# Patient Record
Sex: FEMALE | URBAN - METROPOLITAN AREA
[De-identification: names, ages, dates, MRNs, and addresses within clinical notes are randomized per-mention and may not be internally consistent; named-entity substitution may affect disease eponyms.]

---

## 2018-10-18 PROBLEM — 251158004 VENTRICULAR TACHYCARDIA, MONOMORPHIC: Status: ACTIVE | Noted: 2018-10-18

## 2018-10-18 PROBLEM — 95570007 KIDNEY STONE: Status: ACTIVE | Noted: 2018-10-18

## 2018-10-18 PROBLEM — 26389007 TOXIC MULTINODULAR GOITER: Status: ACTIVE | Noted: 2018-10-18

## 2018-10-18 PROBLEM — 313436004 TYPE II DIABETES MELLITUS WITHOUT COMPLICATION: Status: ACTIVE | Noted: 2018-10-18

## 2018-10-18 PROBLEM — 91302008 SEPSIS: Status: ACTIVE | Noted: 2018-10-18

## 2018-10-18 PROBLEM — 13644009 HYPERCHOLESTEROLEMIA: Status: ACTIVE | Noted: 2018-10-18

## 2021-08-28 ENCOUNTER — TELEPHONE ENCOUNTER (OUTPATIENT)
Dept: URBAN - METROPOLITAN AREA CLINIC 13 | Facility: CLINIC | Age: 56
End: 2021-08-28

## 2021-08-29 ENCOUNTER — TELEPHONE ENCOUNTER (OUTPATIENT)
Dept: URBAN - METROPOLITAN AREA CLINIC 13 | Facility: CLINIC | Age: 56
End: 2021-08-29

## 2021-08-29 RX ORDER — BIOTIN 2500 MCG
CAPSULE ORAL
Status: ACTIVE | COMMUNITY

## 2021-08-29 RX ORDER — MULTIVIT-MIN/FOLIC/VIT K/LYCOP 400-300MCG
TABLET ORAL
Status: ACTIVE | COMMUNITY

## 2021-08-29 RX ORDER — TRAMADOL HYDROCHLORIDE 50 MG/1
TABLET, FILM COATED ORAL
Status: ACTIVE | COMMUNITY

## 2021-08-29 RX ORDER — LEVOTHYROXINE SODIUM 200 MCG
TABLET ORAL
Status: ACTIVE | COMMUNITY

## 2021-08-29 RX ORDER — PRAVASTATIN SODIUM 80 MG/1
TABLET ORAL
Status: ACTIVE | COMMUNITY

## 2021-08-29 RX ORDER — DOCUSATE SODIUM 100 MG/1
CAPSULE, LIQUID FILLED ORAL
Status: ACTIVE | COMMUNITY

## 2021-08-29 RX ORDER — DAPAGLIFLOZIN 10 MG/1
TABLET, FILM COATED ORAL
Status: ACTIVE | COMMUNITY

## 2022-05-03 ENCOUNTER — WEB ENCOUNTER (OUTPATIENT)
Dept: URBAN - METROPOLITAN AREA CLINIC 82 | Facility: CLINIC | Age: 57
End: 2022-05-03

## 2022-05-05 ENCOUNTER — OFFICE VISIT (OUTPATIENT)
Dept: URBAN - METROPOLITAN AREA CLINIC 82 | Facility: CLINIC | Age: 57
End: 2022-05-05
Payer: COMMERCIAL

## 2022-05-05 ENCOUNTER — LAB OUTSIDE AN ENCOUNTER (OUTPATIENT)
Dept: URBAN - METROPOLITAN AREA CLINIC 82 | Facility: CLINIC | Age: 57
End: 2022-05-05

## 2022-05-05 DIAGNOSIS — Z80.0 FAMILY HISTORY OF STOMACH CANCER: ICD-10-CM

## 2022-05-05 DIAGNOSIS — K21.9 GASTROESOPHAGEAL REFLUX DISEASE WITHOUT ESOPHAGITIS: ICD-10-CM

## 2022-05-05 DIAGNOSIS — K86.89 FATTY PANCREAS: ICD-10-CM

## 2022-05-05 PROCEDURE — 99244 OFF/OP CNSLTJ NEW/EST MOD 40: CPT | Performed by: INTERNAL MEDICINE

## 2022-05-05 RX ORDER — TRAMADOL HYDROCHLORIDE 50 MG/1
TABLET, FILM COATED ORAL
Status: ACTIVE | COMMUNITY

## 2022-05-05 RX ORDER — LEVOTHYROXINE SODIUM 200 MCG
TABLET ORAL
Status: DISCONTINUED | COMMUNITY

## 2022-05-05 RX ORDER — ROSUVASTATIN CALCIUM 20 MG/1
1 TABLET TABLET, FILM COATED ORAL ONCE A DAY
Status: ACTIVE | COMMUNITY

## 2022-05-05 RX ORDER — LEVOTHYROXINE SODIUM 125 UG/1
1 TABLET IN THE MORNING ON AN EMPTY STOMACH TABLET ORAL ONCE A DAY
Status: ACTIVE | COMMUNITY

## 2022-05-05 RX ORDER — GABAPENTIN 300 MG/1
1 CAPSULE CAPSULE ORAL ONCE A DAY
Status: ACTIVE | COMMUNITY

## 2022-05-05 RX ORDER — DOCUSATE SODIUM 100 MG/1
CAPSULE, LIQUID FILLED ORAL
Status: DISCONTINUED | COMMUNITY

## 2022-05-05 RX ORDER — ORAL SEMAGLUTIDE 7 MG/1
1 TABLET AT LEAST 30 MINUTES BEFORE FIRST FOOD, BEVERAGE OR OTHER ORAL MEDICINE OF THE DAY TABLET ORAL ONCE A DAY
Status: ACTIVE | COMMUNITY

## 2022-05-05 RX ORDER — DAPAGLIFLOZIN 10 MG/1
TABLET, FILM COATED ORAL
Status: DISCONTINUED | COMMUNITY

## 2022-05-05 RX ORDER — ADHESIVE TAPE 3"X 2.3 YD
AS DIRECTED TAPE, NON-MEDICATED TOPICAL
Status: ACTIVE | COMMUNITY

## 2022-05-05 RX ORDER — OMEPRAZOLE 20 MG/1
1 CAPSULE 30 MINUTES BEFORE MORNING MEAL CAPSULE, DELAYED RELEASE ORAL ONCE A DAY
Status: ACTIVE | COMMUNITY

## 2022-05-05 RX ORDER — METAXALONE 800 MG/1
2 TABLETS TABLET ORAL THREE TIMES A DAY
Status: ACTIVE | COMMUNITY

## 2022-05-05 RX ORDER — BIOTIN 2500 MCG
CAPSULE ORAL
Status: DISCONTINUED | COMMUNITY

## 2022-05-05 RX ORDER — PRAVASTATIN SODIUM 80 MG/1
TABLET ORAL
Status: DISCONTINUED | COMMUNITY

## 2022-05-05 RX ORDER — ALBUTEROL SULFATE 90 UG/1
1 PUFF AS NEEDED AEROSOL, METERED RESPIRATORY (INHALATION)
Status: ACTIVE | COMMUNITY

## 2022-05-05 RX ORDER — MULTIVIT-MIN/FOLIC/VIT K/LYCOP 400-300MCG
TABLET ORAL
Status: DISCONTINUED | COMMUNITY

## 2022-05-05 NOTE — HPI-TODAY'S VISIT:
The patient was referred by [place] [facility] for abnormal pancreas  .   A copy of this document is being forwarded to the referring provider. This child is a 56-year-old female came into the office for the evaluation of her movements in the pancreas that was seen on the routine CT scan of the abdomen.  Patient stated she had extensive work-up for history of nephrolithiasis admitted for urosepsis at home.  She was noted to have a hypoechoic lesion and a possible fatty infiltration of the pancreas which was followed by MRI of the abdomen with and without contrast again showed 1.2 cm fatty infiltration of the head of the pancreas.  Patient denies any upper GI epigastric pain however she does have symptoms of epigastric fullness bloating nausea and intermittent vomiting.  Patient stated she had diabetes was not well controlled until recently.  She had been on several medications of patient stated since she had been some adjustment of the medication she reduce her abdominal bloating and discomfort.  She also reports having chronic acid reflux for several years and she had been taking PPI with some partial relief of symptoms she gets reflux symptoms and regurgitation of food into her throat.  Patient also reports having a colonoscopy about 2 years ago at [facility] and was told to be unremarkable.  Patient stated she has been having issues with the.  Inconsistent and also hyperand hypokalemia and magnesium here.  She states she cut down on taking the famotidine her symptoms have been better, however her reflux has required a acid reducing medication and she is on PPI once a day.  Patient denies any dysphagia.  Patient has not had any further upper endoscopy evaluation.  Patient denies any family history of inflammatory bowel disease no family history of autoimmune liver disease or autoimmune pancreatitis or pancreatic cancer.

## 2022-05-10 PROBLEM — 275108004: Status: ACTIVE | Noted: 2022-05-05

## 2022-05-10 PROBLEM — 266435005: Status: ACTIVE | Noted: 2022-05-05

## 2022-05-24 ENCOUNTER — WEB ENCOUNTER (OUTPATIENT)
Dept: URBAN - METROPOLITAN AREA CLINIC 82 | Facility: CLINIC | Age: 57
End: 2022-05-24

## 2022-06-01 ENCOUNTER — WEB ENCOUNTER (OUTPATIENT)
Dept: URBAN - METROPOLITAN AREA SURGERY CENTER 13 | Facility: SURGERY CENTER | Age: 57
End: 2022-06-01

## 2022-06-02 ENCOUNTER — TELEPHONE ENCOUNTER (OUTPATIENT)
Dept: URBAN - METROPOLITAN AREA CLINIC 92 | Facility: CLINIC | Age: 57
End: 2022-06-02

## 2022-06-02 ENCOUNTER — OFFICE VISIT (OUTPATIENT)
Dept: URBAN - METROPOLITAN AREA SURGERY CENTER 13 | Facility: SURGERY CENTER | Age: 57
End: 2022-06-02
Payer: COMMERCIAL

## 2022-06-02 ENCOUNTER — CLAIMS CREATED FROM THE CLAIM WINDOW (OUTPATIENT)
Dept: URBAN - METROPOLITAN AREA CLINIC 4 | Facility: CLINIC | Age: 57
End: 2022-06-02
Payer: COMMERCIAL

## 2022-06-02 ENCOUNTER — WEB ENCOUNTER (OUTPATIENT)
Dept: URBAN - METROPOLITAN AREA CLINIC 82 | Facility: CLINIC | Age: 57
End: 2022-06-02

## 2022-06-02 DIAGNOSIS — K29.70 GASTRITIS, UNSPECIFIED, WITHOUT BLEEDING: ICD-10-CM

## 2022-06-02 DIAGNOSIS — K21.9 ACID REFLUX: ICD-10-CM

## 2022-06-02 DIAGNOSIS — K22.2 ACQUIRED ESOPHAGEAL RING: ICD-10-CM

## 2022-06-02 DIAGNOSIS — K31.89 ACQUIRED DEFORMITY OF DUODENUM: ICD-10-CM

## 2022-06-02 DIAGNOSIS — K21.9 GASTRO-ESOPHAGEAL REFLUX DISEASE WITHOUT ESOPHAGITIS: ICD-10-CM

## 2022-06-02 DIAGNOSIS — R10.13 ABDOMINAL DISCOMFORT, EPIGASTRIC: ICD-10-CM

## 2022-06-02 PROCEDURE — 88305 TISSUE EXAM BY PATHOLOGIST: CPT | Performed by: PATHOLOGY

## 2022-06-02 PROCEDURE — 43239 EGD BIOPSY SINGLE/MULTIPLE: CPT | Performed by: INTERNAL MEDICINE

## 2022-06-02 PROCEDURE — 88312 SPECIAL STAINS GROUP 1: CPT | Performed by: PATHOLOGY

## 2022-06-02 PROCEDURE — G8907 PT DOC NO EVENTS ON DISCHARG: HCPCS | Performed by: INTERNAL MEDICINE

## 2022-06-02 PROCEDURE — 43248 EGD GUIDE WIRE INSERTION: CPT | Performed by: INTERNAL MEDICINE

## 2022-06-02 RX ORDER — ORAL SEMAGLUTIDE 7 MG/1
1 TABLET AT LEAST 30 MINUTES BEFORE FIRST FOOD, BEVERAGE OR OTHER ORAL MEDICINE OF THE DAY TABLET ORAL ONCE A DAY
Status: ACTIVE | COMMUNITY

## 2022-06-02 RX ORDER — LEVOTHYROXINE SODIUM 125 UG/1
1 TABLET IN THE MORNING ON AN EMPTY STOMACH TABLET ORAL ONCE A DAY
Status: ACTIVE | COMMUNITY

## 2022-06-02 RX ORDER — ROSUVASTATIN CALCIUM 20 MG/1
1 TABLET TABLET, FILM COATED ORAL ONCE A DAY
Status: ACTIVE | COMMUNITY

## 2022-06-02 RX ORDER — METAXALONE 800 MG/1
2 TABLETS TABLET ORAL THREE TIMES A DAY
Status: ACTIVE | COMMUNITY

## 2022-06-02 RX ORDER — PANTOPRAZOLE SODIUM 40 MG/1
1 TABLET TABLET, DELAYED RELEASE ORAL ONCE A DAY
Qty: 90 TABLET | Refills: 1 | OUTPATIENT
Start: 2022-06-02

## 2022-06-02 RX ORDER — ALBUTEROL SULFATE 90 UG/1
1 PUFF AS NEEDED AEROSOL, METERED RESPIRATORY (INHALATION)
Status: ACTIVE | COMMUNITY

## 2022-06-02 RX ORDER — SUCRALFATE 1 G
1 TABLET ON AN EMPTY STOMACH TABLET ORAL TWICE A DAY
Qty: 60 TABLET | Refills: 0 | Status: ACTIVE | COMMUNITY
Start: 2022-06-02 | End: 2022-07-02

## 2022-06-02 RX ORDER — TRAMADOL HYDROCHLORIDE 50 MG/1
TABLET, FILM COATED ORAL
Status: ACTIVE | COMMUNITY

## 2022-06-02 RX ORDER — ADHESIVE TAPE 3"X 2.3 YD
AS DIRECTED TAPE, NON-MEDICATED TOPICAL
Status: ACTIVE | COMMUNITY

## 2022-06-02 RX ORDER — OMEPRAZOLE 20 MG/1
1 CAPSULE 30 MINUTES BEFORE MORNING MEAL CAPSULE, DELAYED RELEASE ORAL ONCE A DAY
Status: ACTIVE | COMMUNITY

## 2022-06-02 RX ORDER — LINACLOTIDE 145 UG/1
1 CAPSULE AT LEAST 30 MINUTES BEFORE THE FIRST MEAL OF THE DAY ON AN EMPTY STOMACH CAPSULE, GELATIN COATED ORAL ONCE A DAY
Qty: 90 CAPSULE | Refills: 1 | OUTPATIENT
Start: 2022-06-02 | End: 2022-11-28

## 2022-06-02 RX ORDER — GABAPENTIN 300 MG/1
1 CAPSULE CAPSULE ORAL ONCE A DAY
Status: ACTIVE | COMMUNITY

## 2022-06-02 RX ORDER — PANTOPRAZOLE SODIUM 40 MG/1
1 TABLET TABLET, DELAYED RELEASE ORAL ONCE A DAY
Qty: 90 TABLET | Refills: 1 | Status: ACTIVE | COMMUNITY
Start: 2022-06-02

## 2022-06-02 RX ORDER — SUCRALFATE 1 G
1 TABLET ON AN EMPTY STOMACH TABLET ORAL TWICE A DAY
Qty: 60 TABLET | Refills: 0 | OUTPATIENT
Start: 2022-06-02 | End: 2022-07-02

## 2022-06-30 ENCOUNTER — WEB ENCOUNTER (OUTPATIENT)
Dept: URBAN - METROPOLITAN AREA CLINIC 82 | Facility: CLINIC | Age: 57
End: 2022-06-30

## 2022-08-24 ENCOUNTER — ERX REFILL RESPONSE (OUTPATIENT)
Dept: URBAN - METROPOLITAN AREA CLINIC 82 | Facility: CLINIC | Age: 57
End: 2022-08-24

## 2022-08-24 RX ORDER — PANTOPRAZOLE SODIUM 40 MG/1
TAKE ONE TABLET BY MOUTH ONE TIME DAILY TABLET, DELAYED RELEASE ORAL
Qty: 90 TABLET | Refills: 0 | OUTPATIENT

## 2022-08-24 RX ORDER — PANTOPRAZOLE SODIUM 40 MG/1
TAKE ONE TABLET BY MOUTH ONE TIME DAILY TABLET, DELAYED RELEASE ORAL
Qty: 90 TABLET | Refills: 1 | OUTPATIENT

## 2023-03-06 NOTE — PHYSICAL EXAM HENT:
Head,  normocephalic,  atraumatic,  Face,  Face within normal limits,  Ears,  External ears within normal limits,  Nose/Nasopharynx,  External nose  normal appearance,  nares patent,  no nasal discharge,  Mouth and Throat,  Oral cavity appearance normal,  Breath odor normal,  Lips,  Appearance normal
FAMILY HISTORY:  No pertinent family history in first degree relatives

## 2023-08-21 ENCOUNTER — DASHBOARD ENCOUNTERS (OUTPATIENT)
Age: 58
End: 2023-08-21

## 2023-08-22 ENCOUNTER — OFFICE VISIT (OUTPATIENT)
Dept: URBAN - METROPOLITAN AREA CLINIC 48 | Facility: CLINIC | Age: 58
End: 2023-08-22
Payer: COMMERCIAL

## 2023-08-22 ENCOUNTER — LAB OUTSIDE AN ENCOUNTER (OUTPATIENT)
Dept: URBAN - METROPOLITAN AREA CLINIC 48 | Facility: CLINIC | Age: 58
End: 2023-08-22

## 2023-08-22 VITALS
TEMPERATURE: 97.7 F | HEART RATE: 77 BPM | HEIGHT: 66 IN | DIASTOLIC BLOOD PRESSURE: 78 MMHG | SYSTOLIC BLOOD PRESSURE: 127 MMHG | WEIGHT: 196 LBS | BODY MASS INDEX: 31.5 KG/M2

## 2023-08-22 DIAGNOSIS — R13.13 PHARYNGEAL DYSPHAGIA: ICD-10-CM

## 2023-08-22 DIAGNOSIS — K86.89 FATTY PANCREAS: ICD-10-CM

## 2023-08-22 DIAGNOSIS — Z80.0 FAMILY HISTORY OF STOMACH CANCER: ICD-10-CM

## 2023-08-22 DIAGNOSIS — K21.9 GASTROESOPHAGEAL REFLUX DISEASE WITHOUT ESOPHAGITIS: ICD-10-CM

## 2023-08-22 PROBLEM — 79922009: Status: ACTIVE | Noted: 2023-08-22

## 2023-08-22 PROBLEM — 21101000119105: Status: ACTIVE | Noted: 2023-08-22

## 2023-08-22 PROCEDURE — 99214 OFFICE O/P EST MOD 30 MIN: CPT | Performed by: INTERNAL MEDICINE

## 2023-08-22 PROCEDURE — 99204 OFFICE O/P NEW MOD 45 MIN: CPT | Performed by: INTERNAL MEDICINE

## 2023-08-22 RX ORDER — LEVOTHYROXINE SODIUM 125 UG/1
1 TABLET IN THE MORNING ON AN EMPTY STOMACH TABLET ORAL ONCE A DAY
Status: ACTIVE | COMMUNITY

## 2023-08-22 RX ORDER — IBUPROFEN 800 MG/1
TAKE ONE TABLET BY MOUTH TWICE A DAY FOR PAIN TABLET, FILM COATED ORAL
Qty: 60 UNSPECIFIED | Refills: 0 | Status: ACTIVE | COMMUNITY

## 2023-08-22 RX ORDER — EPINEPHRINE 0.3 MG/.3ML
AS DIRECTED INJECTION INTRAMUSCULAR
Status: ACTIVE | COMMUNITY

## 2023-08-22 RX ORDER — ESTRADIOL 10 UG/1
TAKE ONE TABLET VAGINAL TWO TIMES A WEEK FOR 90 DAYS TABLET, FILM COATED VAGINAL
Qty: 8 UNSPECIFIED | Refills: 0 | Status: ACTIVE | COMMUNITY

## 2023-08-22 RX ORDER — DAPAGLIFLOZIN 5 MG/1
TAKE ONE TABLET BY MOUTH ONE TIME DAILY TABLET, FILM COATED ORAL
Qty: 30 UNSPECIFIED | Refills: 4 | Status: ACTIVE | COMMUNITY

## 2023-08-22 RX ORDER — TRAMADOL HYDROCHLORIDE 50 MG/1
1 TABLET AS NEEDED TABLET, FILM COATED ORAL ONCE A DAY
Status: ACTIVE | COMMUNITY

## 2023-08-22 RX ORDER — PROGESTERONE 100 MG/1
TAKE ONE CAPSULE BY MOUTH EVERY NIGHT CAPSULE ORAL
Qty: 30 UNSPECIFIED | Refills: 5 | Status: ACTIVE | COMMUNITY

## 2023-08-22 RX ORDER — SUCRALFATE 1 G/1
TAKE ONE TABLET BY MOUTH FOUR TIMES A DAY AND THEN DECREASE AS ABLE FOR GERD TABLET ORAL
Qty: 120 UNSPECIFIED | Refills: 1 | Status: ACTIVE | COMMUNITY

## 2023-08-22 RX ORDER — SITAGLIPTIN 100 MG/1
TAKE ONE TABLET BY MOUTH ONE TIME DAILY FOR DIABETES TABLET, FILM COATED ORAL
Qty: 30 UNSPECIFIED | Refills: 1 | Status: ACTIVE | COMMUNITY

## 2023-08-22 RX ORDER — ALBUTEROL SULFATE 90 UG/1
1 PUFF AS NEEDED AEROSOL, METERED RESPIRATORY (INHALATION)
Status: ACTIVE | COMMUNITY

## 2023-08-22 RX ORDER — GABAPENTIN 300 MG/1
1 CAPSULE CAPSULE ORAL ONCE A DAY
Status: ACTIVE | COMMUNITY

## 2023-08-22 RX ORDER — METAXALONE 800 MG/1
2 TABLETS TABLET ORAL THREE TIMES A DAY
Status: ACTIVE | COMMUNITY

## 2023-08-22 RX ORDER — OMEPRAZOLE 20 MG/1
1 CAPSULE 30 MINUTES BEFORE MORNING MEAL CAPSULE, DELAYED RELEASE ORAL ONCE A DAY
Status: ACTIVE | COMMUNITY

## 2023-08-22 RX ORDER — ROSUVASTATIN CALCIUM 20 MG/1
1 TABLET TABLET, FILM COATED ORAL ONCE A DAY
Status: ACTIVE | COMMUNITY

## 2023-08-22 RX ORDER — FAMOTIDINE 40 MG/1
TAKE ONE TABLET BY MOUTH AT BEDTIME FOR STOMACH ACID TABLET, FILM COATED ORAL
Qty: 30 UNSPECIFIED | Refills: 1 | Status: ACTIVE | COMMUNITY

## 2023-08-22 RX ORDER — METFORMIN HYDROCHLORIDE 500 MG/1
TAKE ONE TABLET BY MOUTH TWICE A DAY WITH A MEAL TABLET, EXTENDED RELEASE ORAL
Qty: 60 UNSPECIFIED | Refills: 2 | Status: ACTIVE | COMMUNITY

## 2023-08-22 RX ORDER — SUCRALFATE 1 G/10ML
10 ML 1 HOUR BEFORE MEALS AND AT BEDTIME ON AN EMPTY STOMACH SUSPENSION ORAL TWICE A DAY
Qty: 600 | Refills: 1 | OUTPATIENT
Start: 2023-08-22 | End: 2023-09-21

## 2023-08-22 NOTE — HPI-TODAY'S VISIT:
58 year old female presents to re-hospitals care. Seen 6/2022 by outside provider for EGD which showed a small hiatal hernia, Grade A esophagitis, schatzki's ring s/p 54 Fr dilatation, and gastritis. She continues to have dysphagia to solids and some larger pills. GERD is controlled on Omeprazole 40mg in the morning and Famotidine 40mg at night. Pantoprazole did not work. She has not had a bone density test. Her stomach feels uneasy and cramps at times and Carafate seems to helps prn. The patient has started a Gluten free diet and she thinks it has improved some upper GI symptoms. She takes FDgard and IBgard prn.  Bowel movements are daily with occasional manual disimpaction due to rectocele. Rectocele repair is scheduled for 10/2023. Since starting a gluten free diet, she has had softer stool. Trialed Linzess and she takes fiber and stool softeners daily. Colonoscopy 2018 showed mild to moderate diverticulum throughout colon.  The  patient reports fatty pancreas as per imaging, but we do not have radiology reports.   She has a history of cardiac abation for v-tach in 2016. Her primary care doctor manages her care and she no longer sees cardiologist. In 2010, she had 7 kidney stones, hospitalized with E.coli and DIC with long term antibiotics.   Her mother has esophageal strictures, GERD, and diverticulitis. Her maternal grandfather had gastric cancer.

## 2023-12-13 ENCOUNTER — OFFICE VISIT (OUTPATIENT)
Dept: URBAN - METROPOLITAN AREA MEDICAL CENTER 35 | Facility: MEDICAL CENTER | Age: 58
End: 2023-12-13